# Patient Record
Sex: MALE | Race: WHITE | NOT HISPANIC OR LATINO | Employment: FULL TIME | URBAN - METROPOLITAN AREA
[De-identification: names, ages, dates, MRNs, and addresses within clinical notes are randomized per-mention and may not be internally consistent; named-entity substitution may affect disease eponyms.]

---

## 2019-07-06 ENCOUNTER — APPOINTMENT (OUTPATIENT)
Dept: RADIOLOGY | Facility: CLINIC | Age: 69
End: 2019-07-06
Payer: COMMERCIAL

## 2019-07-06 ENCOUNTER — OFFICE VISIT (OUTPATIENT)
Dept: URGENT CARE | Facility: CLINIC | Age: 69
End: 2019-07-06
Payer: COMMERCIAL

## 2019-07-06 VITALS
WEIGHT: 296 LBS | TEMPERATURE: 98.7 F | HEIGHT: 67 IN | SYSTOLIC BLOOD PRESSURE: 138 MMHG | HEART RATE: 75 BPM | BODY MASS INDEX: 46.46 KG/M2 | DIASTOLIC BLOOD PRESSURE: 72 MMHG | OXYGEN SATURATION: 95 %

## 2019-07-06 DIAGNOSIS — M25.561 ACUTE PAIN OF RIGHT KNEE: Primary | ICD-10-CM

## 2019-07-06 DIAGNOSIS — M25.561 ACUTE PAIN OF RIGHT KNEE: ICD-10-CM

## 2019-07-06 PROCEDURE — G0382 LEV 3 HOSP TYPE B ED VISIT: HCPCS | Performed by: PHYSICIAN ASSISTANT

## 2019-07-06 PROCEDURE — 73562 X-RAY EXAM OF KNEE 3: CPT

## 2019-07-06 PROCEDURE — 99283 EMERGENCY DEPT VISIT LOW MDM: CPT | Performed by: PHYSICIAN ASSISTANT

## 2019-07-06 RX ORDER — ATORVASTATIN CALCIUM 20 MG/1
20 TABLET, FILM COATED ORAL DAILY
COMMUNITY

## 2019-07-06 RX ORDER — LOSARTAN POTASSIUM AND HYDROCHLOROTHIAZIDE 12.5; 5 MG/1; MG/1
1 TABLET ORAL DAILY
COMMUNITY

## 2019-07-06 NOTE — PROGRESS NOTES
[facility]        NAME: Adeline Smith is a 68 y o  male  : 1950    MRN: [id number]  DATE: 2019  TIME: 1:22 PM    Assessment and Plan   Acute pain of right knee [M25 561]  1  Acute pain of right knee  XR knee 3 vw right non injury         Patient Instructions     1  Right knee pain  -Xray shows arthritis with no acute abnormality  -Do RICE protocol at home (rest, ice, compression, elevate)  -Wear ace/aircast and weight bear as tolerated  -Use tylenol/motrin as needed  -Follow-up with Orthopedics for re-evaluation within 1 week    Go to ER with worsening symptoms, worsening pain, or any signs of distress    Chief Complaint     Chief Complaint   Patient presents with    Knee Pain     PT complains of right knee pain since Monday         History of Present Illness       The patient presents today for an evaluation of right knee pain that started on Monday  The patient states that he started having pain while walking  The patient rates his pain as a 0/10 pain while at rest however it becomes worse with walking  He took 4 motrin prior to arrival  No direct injury or trauma  Review of Systems   Review of Systems   Constitutional: Negative for chills and fever  Respiratory: Negative for shortness of breath  Cardiovascular: Negative for chest pain  Musculoskeletal: Positive for arthralgias  Negative for joint swelling  Skin: Negative for rash  Neurological: Negative for weakness and numbness  All other systems reviewed and are negative          Current Medications       Current Outpatient Medications:     atorvastatin (LIPITOR) 20 mg tablet, Take 20 mg by mouth daily, Disp: , Rfl:     losartan-hydrochlorothiazide (HYZAAR) 50-12 5 mg per tablet, Take 1 tablet by mouth daily, Disp: , Rfl:     Current Allergies     Allergies as of 2019    (No Known Allergies)            The following portions of the patient's history were reviewed and updated as appropriate: allergies, current medications, past family history, past medical history, past social history, past surgical history and problem list      History reviewed  No pertinent past medical history  History reviewed  No pertinent surgical history  History reviewed  No pertinent family history  Medications have been verified  Objective   /72 (BP Location: Right arm, Patient Position: Sitting, Cuff Size: Large)   Pulse 75   Temp 98 7 °F (37 1 °C)   Ht 5' 7" (1 702 m)   Wt 134 kg (296 lb)   SpO2 95%   BMI 46 36 kg/m²        Physical Exam     Physical Exam   Constitutional: He is oriented to person, place, and time  He appears well-developed and well-nourished  No distress  Cardiovascular: Normal rate, regular rhythm and normal heart sounds  Pulmonary/Chest: Effort normal and breath sounds normal    Musculoskeletal:        Left knee: He exhibits normal range of motion, no swelling and no effusion  Tenderness found  Medial joint line tenderness noted  Neurological: He is alert and oriented to person, place, and time  Skin: Skin is dry  Psychiatric: He has a normal mood and affect  Nursing note and vitals reviewed

## 2019-07-06 NOTE — PATIENT INSTRUCTIONS
1  Right knee pain  -Xray shows arthritis with no acute abnormality  -Do RICE protocol at home (rest, ice, compression, elevate)  -Wear ace/aircast and weight bear as tolerated  -Use tylenol/motrin as needed  -Follow-up with Orthopedics for re-evaluation within 1 week    Go to ER with worsening symptoms, worsening pain, or any signs of distress

## 2019-12-24 ENCOUNTER — OFFICE VISIT (OUTPATIENT)
Dept: URGENT CARE | Facility: CLINIC | Age: 69
End: 2019-12-24
Payer: COMMERCIAL

## 2019-12-24 VITALS
RESPIRATION RATE: 18 BRPM | SYSTOLIC BLOOD PRESSURE: 130 MMHG | HEART RATE: 84 BPM | TEMPERATURE: 97.6 F | OXYGEN SATURATION: 95 % | BODY MASS INDEX: 47.68 KG/M2 | HEIGHT: 67 IN | WEIGHT: 303.8 LBS | DIASTOLIC BLOOD PRESSURE: 70 MMHG

## 2019-12-24 DIAGNOSIS — T16.2XXA FB EAR, LEFT, INITIAL ENCOUNTER: Primary | ICD-10-CM

## 2019-12-24 PROCEDURE — G0381 LEV 2 HOSP TYPE B ED VISIT: HCPCS | Performed by: PHYSICIAN ASSISTANT

## 2019-12-24 PROCEDURE — 69200 CLEAR OUTER EAR CANAL: CPT | Performed by: PHYSICIAN ASSISTANT

## 2019-12-24 PROCEDURE — 99282 EMERGENCY DEPT VISIT SF MDM: CPT | Performed by: PHYSICIAN ASSISTANT

## 2019-12-24 RX ORDER — TAMSULOSIN HYDROCHLORIDE 0.4 MG/1
CAPSULE ORAL
Refills: 0 | COMMUNITY
Start: 2019-11-20

## 2019-12-24 NOTE — PROGRESS NOTES
[email or web address]        NAME: Jorge Hendrix is a 68 y o  male  : 1950    MRN: [id number]  DATE: 2019  TIME: 12:10 PM    Assessment and Plan   FB ear, left, initial encounter [T16  2XXA]  1  FB ear, left, initial encounter           Patient Instructions     Patient Instructions   Keep the ear dry today  Do not put Q-tips in your ear  Follow up with PCP in 3-5 days  Proceed to  ER if symptoms worsen  Chief Complaint     Chief Complaint   Patient presents with    Earache     L ear decreased hearing; feels clogged  denies pain  History of Present Illness       58-year-old male presents to the clinic complaining of left ear feeling block for last several weeks off and on  He feels like he can't hear  No pain or drainage  No fever  No cough or runny nose  Review of Systems   Review of Systems   Constitutional: Negative for chills, fatigue and fever  HENT: Positive for hearing loss  Negative for congestion, ear pain, postnasal drip, rhinorrhea, sinus pressure, sinus pain and sore throat  Eyes: Negative for pain, redness and visual disturbance  Respiratory: Negative for cough, shortness of breath and wheezing  Cardiovascular: Negative for chest pain and palpitations  Gastrointestinal: Negative for abdominal pain, diarrhea, nausea and vomiting  Neurological: Negative for dizziness and headaches           Current Medications       Current Outpatient Medications:     atorvastatin (LIPITOR) 20 mg tablet, Take 20 mg by mouth daily, Disp: , Rfl:     losartan-hydrochlorothiazide (HYZAAR) 50-12 5 mg per tablet, Take 1 tablet by mouth daily, Disp: , Rfl:     tamsulosin (FLOMAX) 0 4 mg, TWO CAPSULES BY MOUTH DAILY FOLLOWING THE SAME MEAL EACH DAY, Disp: , Rfl: 0    Current Allergies     Allergies as of 2019    (No Known Allergies)            The following portions of the patient's history were reviewed and updated as appropriate: allergies, current medications, past family history, past medical history, past social history, past surgical history and problem list      Past Medical History:   Diagnosis Date    Hypertension        History reviewed  No pertinent surgical history  No family history on file  Medications have been verified  Objective   /70 (BP Location: Left arm, Patient Position: Sitting)   Pulse 84   Temp 97 6 °F (36 4 °C) (Temporal)   Resp 18   Ht 5' 7" (1 702 m)   Wt (!) 138 kg (303 lb 12 8 oz)   SpO2 95%   BMI 47 58 kg/m²        Physical Exam     Physical Exam   Constitutional: He appears well-developed and well-nourished  No distress  HENT:   Head: Normocephalic and atraumatic  Nose: Nose normal    Mouth/Throat: Oropharynx is clear and moist    Bilateral external auditory canal the 80s with some dry skin  Right ear normal   Left ear has cerumen and cotton swab stuck in the ear  This was removed with irrigation and alligator forceps  Symptoms improved  Foreign body removal  Date/Time: 12/24/2019 12:02 PM  Performed by: Kaykay Dallas PA-C  Authorized by: Kaykay Dallas PA-C   Universal Protocol:Consent: Verbal consent obtained  Consent given by: patient and spouse  Patient identity confirmed: verbally with patient    Body area: ear  Location details: left ear  Removal mechanism: alligator forceps  1 objects recovered    Objects recovered: Cotton swab tip